# Patient Record
Sex: FEMALE | Race: WHITE | URBAN - NONMETROPOLITAN AREA
[De-identification: names, ages, dates, MRNs, and addresses within clinical notes are randomized per-mention and may not be internally consistent; named-entity substitution may affect disease eponyms.]

---

## 2021-01-14 ENCOUNTER — OFFICE VISIT (OUTPATIENT)
Dept: PRIMARY CARE CLINIC | Age: 36
End: 2021-01-14

## 2021-01-14 VITALS
TEMPERATURE: 97.5 F | RESPIRATION RATE: 18 BRPM | WEIGHT: 195 LBS | OXYGEN SATURATION: 96 % | BODY MASS INDEX: 31.34 KG/M2 | DIASTOLIC BLOOD PRESSURE: 82 MMHG | HEIGHT: 66 IN | HEART RATE: 104 BPM | SYSTOLIC BLOOD PRESSURE: 118 MMHG

## 2021-01-14 DIAGNOSIS — F41.9 ANXIETY: ICD-10-CM

## 2021-01-14 DIAGNOSIS — R63.5 WEIGHT GAIN: ICD-10-CM

## 2021-01-14 DIAGNOSIS — R63.5 WEIGHT GAIN: Primary | ICD-10-CM

## 2021-01-14 DIAGNOSIS — G43.809 OTHER MIGRAINE WITHOUT STATUS MIGRAINOSUS, NOT INTRACTABLE: ICD-10-CM

## 2021-01-14 LAB
ALBUMIN SERPL-MCNC: 4.3 G/DL (ref 3.5–5.2)
ALP BLD-CCNC: 79 U/L (ref 35–104)
ALT SERPL-CCNC: 31 U/L (ref 0–32)
ANION GAP SERPL CALCULATED.3IONS-SCNC: 13 MMOL/L (ref 7–16)
AST SERPL-CCNC: 21 U/L (ref 0–31)
BASOPHILS ABSOLUTE: 0.03 E9/L (ref 0–0.2)
BASOPHILS RELATIVE PERCENT: 0.6 % (ref 0–2)
BILIRUB SERPL-MCNC: <0.2 MG/DL (ref 0–1.2)
BUN BLDV-MCNC: 15 MG/DL (ref 6–20)
CALCIUM SERPL-MCNC: 9.1 MG/DL (ref 8.6–10.2)
CHLORIDE BLD-SCNC: 104 MMOL/L (ref 98–107)
CO2: 25 MMOL/L (ref 22–29)
CREAT SERPL-MCNC: 0.9 MG/DL (ref 0.5–1)
EOSINOPHILS ABSOLUTE: 0.02 E9/L (ref 0.05–0.5)
EOSINOPHILS RELATIVE PERCENT: 0.4 % (ref 0–6)
GFR AFRICAN AMERICAN: >60
GFR NON-AFRICAN AMERICAN: >60 ML/MIN/1.73
GLUCOSE BLD-MCNC: 99 MG/DL (ref 74–99)
HCT VFR BLD CALC: 44.8 % (ref 34–48)
HEMOGLOBIN: 13.8 G/DL (ref 11.5–15.5)
IMMATURE GRANULOCYTES #: 0.02 E9/L
IMMATURE GRANULOCYTES %: 0.4 % (ref 0–5)
LYMPHOCYTES ABSOLUTE: 1.79 E9/L (ref 1.5–4)
LYMPHOCYTES RELATIVE PERCENT: 36.5 % (ref 20–42)
MCH RBC QN AUTO: 29.2 PG (ref 26–35)
MCHC RBC AUTO-ENTMCNC: 30.8 % (ref 32–34.5)
MCV RBC AUTO: 94.7 FL (ref 80–99.9)
MONOCYTES ABSOLUTE: 0.34 E9/L (ref 0.1–0.95)
MONOCYTES RELATIVE PERCENT: 6.9 % (ref 2–12)
NEUTROPHILS ABSOLUTE: 2.71 E9/L (ref 1.8–7.3)
NEUTROPHILS RELATIVE PERCENT: 55.2 % (ref 43–80)
PDW BLD-RTO: 13 FL (ref 11.5–15)
PLATELET # BLD: 303 E9/L (ref 130–450)
PMV BLD AUTO: 10.4 FL (ref 7–12)
POTASSIUM SERPL-SCNC: 4.6 MMOL/L (ref 3.5–5)
RBC # BLD: 4.73 E12/L (ref 3.5–5.5)
SODIUM BLD-SCNC: 142 MMOL/L (ref 132–146)
TOTAL PROTEIN: 7.6 G/DL (ref 6.4–8.3)
TSH SERPL DL<=0.05 MIU/L-ACNC: 3.39 UIU/ML (ref 0.27–4.2)
WBC # BLD: 4.9 E9/L (ref 4.5–11.5)

## 2021-01-14 PROCEDURE — 99203 OFFICE O/P NEW LOW 30 MIN: CPT | Performed by: NURSE PRACTITIONER

## 2021-01-14 RX ORDER — PHENTERMINE HYDROCHLORIDE 37.5 MG/1
CAPSULE ORAL
COMMUNITY
End: 2021-01-14

## 2021-01-14 RX ORDER — BUTALBITAL, ACETAMINOPHEN AND CAFFEINE 300; 40; 50 MG/1; MG/1; MG/1
CAPSULE ORAL
COMMUNITY
End: 2021-09-20

## 2021-01-14 RX ORDER — PROMETHAZINE HYDROCHLORIDE 25 MG/1
25 TABLET ORAL EVERY 6 HOURS PRN
Qty: 30 TABLET | Refills: 2 | Status: SHIPPED | OUTPATIENT
Start: 2021-01-14

## 2021-01-14 RX ORDER — NAPROXEN 500 MG/1
500 TABLET ORAL 2 TIMES DAILY
COMMUNITY
Start: 2020-01-23 | End: 2021-01-14 | Stop reason: SDUPTHER

## 2021-01-14 RX ORDER — CYCLOBENZAPRINE HCL 10 MG
10 TABLET ORAL 3 TIMES DAILY PRN
COMMUNITY

## 2021-01-14 RX ORDER — ALPRAZOLAM 0.5 MG/1
TABLET ORAL
COMMUNITY
End: 2021-01-14 | Stop reason: SDUPTHER

## 2021-01-14 RX ORDER — ALPRAZOLAM 0.5 MG/1
TABLET ORAL
Qty: 60 TABLET | Refills: 0 | Status: SHIPPED | OUTPATIENT
Start: 2021-01-14 | End: 2021-02-14

## 2021-01-14 RX ORDER — ESTRADIOL 0.05 MG/D
1 FILM, EXTENDED RELEASE TRANSDERMAL
COMMUNITY

## 2021-01-14 RX ORDER — NAPROXEN 500 MG/1
500 TABLET ORAL 2 TIMES DAILY
Qty: 60 TABLET | Refills: 3 | Status: SHIPPED | OUTPATIENT
Start: 2021-01-14

## 2021-01-14 RX ORDER — PROMETHAZINE HYDROCHLORIDE 25 MG/1
25 TABLET ORAL EVERY 6 HOURS PRN
COMMUNITY
End: 2021-01-14 | Stop reason: SDUPTHER

## 2021-01-14 ASSESSMENT — ENCOUNTER SYMPTOMS
RESPIRATORY NEGATIVE: 1
EYES NEGATIVE: 1
GASTROINTESTINAL NEGATIVE: 1
ALLERGIC/IMMUNOLOGIC NEGATIVE: 1

## 2021-01-14 ASSESSMENT — PATIENT HEALTH QUESTIONNAIRE - PHQ9
SUM OF ALL RESPONSES TO PHQ QUESTIONS 1-9: 0

## 2021-01-14 NOTE — PROGRESS NOTES
Subjective  CC: Patient presents to establish. Was previously seeing a PA in [place]. Last seen about 12 months ago. HPI:   Will be seeing Dr. Shikha Marin in [place] for GYN. Patient is here to establish. She reports a history of migraines, states she has previously been on Topamax, amitriptyline, Imitrex, currently takes Fioricet and Maxalt to manage this. She does not take any SSRIs or SNRIs. She also reports a history of anxiety, previously has been on BuSpar which caused more anxiety. She states she takes Xanax about 3 times per week. 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 Her mother, who has early onset dementia, lives with them and cannot be alone. The patient also has 2 children, and she is going to school for nursing through [employer]. The patient states that she has gained about 45 pounds in the past year. She has been on phentermine but states this was only helpful while she was taking it and she gained the weight back. She states she is due for lab work including thyroid which will be checked today. She is up-to-date on cervical cancer screenings. She has had the influenza vaccine. ROS:  Review of Systems   Constitutional: Positive for unexpected weight change. Eyes: Negative. Respiratory: Negative. Cardiovascular: Negative. Gastrointestinal: Negative. Endocrine:        Weight gain   Genitourinary:        Status post hysterectomy   Musculoskeletal: Negative. Allergic/Immunologic: Negative. Neurological:        Migraines   Hematological: Negative.     Psychiatric/Behavioral:        Anxiety          Current Outpatient Medications:     naproxen (NAPROSYN) 500 MG tablet, Take 1 tablet by mouth 2 times daily, Disp: 60 tablet, Rfl: 3    ALPRAZolam (XANAX) 0.5 MG tablet, 1 tablet twice a day as needed, Disp: 60 tablet, Rfl: 0   promethazine (PHENERGAN) 25 MG tablet, Take 1 tablet by mouth every 6 hours as needed for Nausea, Disp: 30 tablet, Rfl: 2    estradiol (VIVELLE) 0.05 MG/24HR, Place 1 patch onto the skin, Disp: , Rfl:     cyclobenzaprine (FLEXERIL) 10 MG tablet, Take 10 mg by mouth daily, Disp: , Rfl:     butalbital-APAP-caffeine -40 MG CAPS per capsule, , Disp: , Rfl:    Allergies   Allergen Reactions    Amoxicillin Hives and Rash    Aspirin Hives    Cefaclor Hives    Sulfamethoxazole-Trimethoprim Hives and Rash    Sulfa Antibiotics Hives        PMH:  Past Medical History:   Diagnosis Date    Anxiety     Migraine without aura and without status migrainosus, not intractable         Objective  Vitals:    01/14/21 0942   BP: 118/82   Site: Left Upper Arm   Position: Sitting   Cuff Size: Medium Adult   Pulse: 104   Resp: 18   Temp: 97.5 °F (36.4 °C)   TempSrc: Temporal   SpO2: 96%   Weight: 195 lb (88.5 kg)   Height: 5' 6\" (1.676 m)      Exam:  Const: Appears healthy, well developed and well nourished. Appears overweight. Eyes: EOMI in both eyes. PERRL. ENMT: External ears WNL. Tympanic membranes are intact. Septum is in the midline. Posterior  pharynx shows no exudate, irritation or redness. Neck: Supple and symmetric. Palpation reveals no adenopathy. No masses appreciated. Thyroid  exhibits no nodule or thyromegaly. No JVD. Resp: Respirations are unlabored. Respiration rate is normal. Auscultate good airflow. No rales,  rhonchi or wheezes appreciated over the lungs bilaterally. Abdomen: BS x 4 quadrants. Abdomen soft, round, nontender. No organomegaly noted. CV: Rhythm is regular. S1 is normal. S2 is normal.  Extremities: No clubbing or cyanosis. Musculo: Walks with a normal gait. Upper Extremities: Full ROM bilaterally. Lower Extremities: Full  ROM bilaterally. Skin: Dry and warm with no rash. Neuro: Alert and oriented x3. Mood is normal. Affect is normal. Speech is articulate and fluent. Leandra Love was seen today for establish care. Diagnoses and all orders for this visit:    Weight gain  -     CBC Auto Differential; Future  -     Comprehensive Metabolic Panel; Future  -     TSH without Reflex; Future    Anxiety  -     ALPRAZolam (XANAX) 0.5 MG tablet; 1 tablet twice a day as needed  -     CBC Auto Differential; Future  -     Comprehensive Metabolic Panel; Future  -     TSH without Reflex; Future    Other migraine without status migrainosus, not intractable    Other orders  -     naproxen (NAPROSYN) 500 MG tablet; Take 1 tablet by mouth 2 times daily  -     promethazine (PHENERGAN) 25 MG tablet; Take 1 tablet by mouth every 6 hours as needed for Nausea       Care Plan:  Discussed with the patient that I think an SNRI may be helpful for her in regards to the anxiety as well as the migraines, and preferable compared to the Xanax and Fioricet. The patient is willing to consider, at this time we will continue the medications she is on, check lab work, and reevaluate in a few months. Notify me of any problems in the meantime. I will try to obtain her old records. OARRS report is reviewed and appropriate.

## 2021-03-18 ENCOUNTER — HOSPITAL ENCOUNTER (EMERGENCY)
Dept: HOSPITAL 83 - ED | Age: 36
Discharge: HOME | End: 2021-03-18
Payer: COMMERCIAL

## 2021-03-18 VITALS — HEIGHT: 65.98 IN | BODY MASS INDEX: 31.34 KG/M2 | WEIGHT: 195 LBS
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
## 2021-03-18 DIAGNOSIS — M62.830: ICD-10-CM

## 2021-03-18 DIAGNOSIS — Z88.5: ICD-10-CM
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
## 2021-03-18 DIAGNOSIS — Z88.1: ICD-10-CM

## 2021-03-18 DIAGNOSIS — Z88.2: ICD-10-CM
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
## 2021-03-18 DIAGNOSIS — Z88.8: ICD-10-CM
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
## 2021-03-18 DIAGNOSIS — S30.0XXA: Primary | ICD-10-CM

## 2021-09-20 ENCOUNTER — OFFICE VISIT (OUTPATIENT)
Dept: FAMILY MEDICINE CLINIC | Age: 36
End: 2021-09-20
Payer: COMMERCIAL

## 2021-09-20 VITALS
BODY MASS INDEX: 33.25 KG/M2 | DIASTOLIC BLOOD PRESSURE: 66 MMHG | TEMPERATURE: 97.4 F | SYSTOLIC BLOOD PRESSURE: 110 MMHG | OXYGEN SATURATION: 98 % | WEIGHT: 206 LBS | HEART RATE: 92 BPM

## 2021-09-20 DIAGNOSIS — U07.1 COVID-19: Primary | ICD-10-CM

## 2021-09-20 DIAGNOSIS — B96.89 ACUTE BACTERIAL SINUSITIS: ICD-10-CM

## 2021-09-20 DIAGNOSIS — J01.90 ACUTE BACTERIAL SINUSITIS: ICD-10-CM

## 2021-09-20 LAB
Lab: NORMAL
PERFORMING INSTRUMENT: NORMAL
QC PASS/FAIL: NORMAL
SARS-COV-2, POC: NORMAL

## 2021-09-20 PROCEDURE — 87426 SARSCOV CORONAVIRUS AG IA: CPT | Performed by: STUDENT IN AN ORGANIZED HEALTH CARE EDUCATION/TRAINING PROGRAM

## 2021-09-20 PROCEDURE — G8417 CALC BMI ABV UP PARAM F/U: HCPCS | Performed by: STUDENT IN AN ORGANIZED HEALTH CARE EDUCATION/TRAINING PROGRAM

## 2021-09-20 PROCEDURE — 99213 OFFICE O/P EST LOW 20 MIN: CPT | Performed by: STUDENT IN AN ORGANIZED HEALTH CARE EDUCATION/TRAINING PROGRAM

## 2021-09-20 PROCEDURE — 1036F TOBACCO NON-USER: CPT | Performed by: STUDENT IN AN ORGANIZED HEALTH CARE EDUCATION/TRAINING PROGRAM

## 2021-09-20 PROCEDURE — G8427 DOCREV CUR MEDS BY ELIG CLIN: HCPCS | Performed by: STUDENT IN AN ORGANIZED HEALTH CARE EDUCATION/TRAINING PROGRAM

## 2021-09-20 RX ORDER — DOXYCYCLINE HYCLATE 100 MG
100 TABLET ORAL 2 TIMES DAILY
Qty: 20 TABLET | Refills: 0 | Status: SHIPPED | OUTPATIENT
Start: 2021-09-20 | End: 2021-09-30

## 2021-09-20 ASSESSMENT — ENCOUNTER SYMPTOMS
SINUS PAIN: 1
SHORTNESS OF BREATH: 0
COUGH: 1
ABDOMINAL PAIN: 0
SINUS PRESSURE: 1
RHINORRHEA: 1
WHEEZING: 0
VOMITING: 0
NAUSEA: 0

## 2021-09-20 NOTE — PROGRESS NOTES
Sorin Villegas (:  1985) is a 31 y.o. female,, here for evaluation of the following chief complaint(s):  Cough, Generalized Body Aches (works at [employer]), and Headache         ASSESSMENT/PLAN:  1. COVID-19  -     POCT COVID-19, Antigen  -     COVID-19 Ambulatory; Future  2. Acute bacterial sinusitis  -     doxycycline hyclate (VIBRA-TABS) 100 MG tablet; Take 1 tablet by mouth 2 times daily for 10 days, Disp-20 tablet, R-0Normal    Rapid COVID negative, will send out. Fever, persistent infection, green sputum. Will treat for bacterial sinusitis. Discussed return precautions. Patient in agreement. Return if symptoms worsen or fail to improve. Subjective   SUBJECTIVE/OBJECTIVE:  8 or 9 days ago started with congestion  -congestion/phlegm causing cough  -facial and teeth pain  -bright green snot  -taking sudafed which is not helping much anymore. Has taken ibuprofen as well  -no SOB unless exerting herself  -does not take any other daily medicines  -prn migrain and anxiety meds  -hx of exercise induced asthma  -rapid covid negative      Review of Systems   Constitutional: Positive for fever. Negative for chills. HENT: Positive for congestion, ear pain, rhinorrhea, sinus pressure and sinus pain. Respiratory: Positive for cough. Negative for shortness of breath and wheezing. Cardiovascular: Negative for chest pain and leg swelling. Gastrointestinal: Negative for abdominal pain, nausea and vomiting. Genitourinary: Negative for dysuria and hematuria. Musculoskeletal: Negative for arthralgias and myalgias. Skin: Negative for rash and wound. Neurological: Negative for dizziness and light-headedness. Objective   Physical Exam  Vitals reviewed. Constitutional:       General: She is not in acute distress. HENT:      Head: Normocephalic and atraumatic.       Right Ear: Tympanic membrane normal.      Left Ear: Tympanic membrane normal.      Nose: Mucosal edema and congestion present. Right Turbinates: Swollen. Left Turbinates: Swollen. Right Sinus: Maxillary sinus tenderness and frontal sinus tenderness present. Left Sinus: Maxillary sinus tenderness and frontal sinus tenderness present. Eyes:      Extraocular Movements: Extraocular movements intact. Conjunctiva/sclera: Conjunctivae normal.   Cardiovascular:      Rate and Rhythm: Normal rate and regular rhythm. Pulmonary:      Effort: Pulmonary effort is normal.      Breath sounds: Normal breath sounds. No wheezing. Abdominal:      General: Abdomen is flat. There is no distension. Musculoskeletal:         General: No tenderness or deformity. Neurological:      General: No focal deficit present. Mental Status: She is alert and oriented to person, place, and time. An electronic signature was used to authenticate this note.     --Binta Hogue MD

## 2021-09-22 LAB
SARS-COV-2: NOT DETECTED
SOURCE: NORMAL

## 2023-12-12 ENCOUNTER — OFFICE VISIT (OUTPATIENT)
Dept: ORTHOPEDIC SURGERY | Age: 38
End: 2023-12-12
Payer: COMMERCIAL

## 2023-12-12 VITALS — HEIGHT: 66 IN | WEIGHT: 195 LBS | BODY MASS INDEX: 31.34 KG/M2

## 2023-12-12 DIAGNOSIS — M65.4 DE QUERVAIN'S TENOSYNOVITIS, LEFT: Primary | ICD-10-CM

## 2023-12-12 DIAGNOSIS — M25.532 LEFT WRIST PAIN: ICD-10-CM

## 2023-12-12 PROCEDURE — 99203 OFFICE O/P NEW LOW 30 MIN: CPT | Performed by: PHYSICIAN ASSISTANT

## 2023-12-12 RX ORDER — ERENUMAB-AOOE 140 MG/ML
INJECTION, SOLUTION SUBCUTANEOUS
COMMUNITY

## 2023-12-12 RX ORDER — PREDNISONE 10 MG/1
TABLET ORAL
Qty: 24 TABLET | Refills: 0 | Status: SHIPPED | OUTPATIENT
Start: 2023-12-12

## 2023-12-12 RX ORDER — LEVOTHYROXINE SODIUM 0.12 MG/1
125 TABLET ORAL EVERY MORNING
COMMUNITY
Start: 2023-11-21

## 2023-12-12 NOTE — PROGRESS NOTES
[facility]  New Patient Note      CHIEF COMPLAINT:   Chief Complaint   Patient presents with    Wrist Pain     Pt presents this PM with c/o pain in her L wrist. States that pain is in radial side of wrist, and extends into her thumb. Hx injections. No known PELON. Started noticing pain a few weeks ago. Pt is L handed. Has been taking Motrin for pain. HISTORY OF PRESENT ILLNESS:                The patient is a 41 y.o. female who presents today with complaints of left wrist pain times a few weeks, no known mechanism of injury. She has had similar symptoms in the past in which she has had cortisone injections. .  Pt localizes the pain to radial aspect of the wrist with pain extending into the thumb. Pt denies any numbness, tingling, loss of sensation or radiation of symptoms into fingers. Pain is worse with range of motion, palpation. They have tried at home therapies of ibuprofen for symptomatic relief. Pt is left hand dominant. Past Medical History:        Diagnosis Date    Anxiety     Migraine without aura and without status migrainosus, not intractable      Past Surgical History:        Procedure Laterality Date     SECTION      x 2    HYSTERECTOMY, VAGINAL  2015    KNEE SURGERY  2017    Had injury in high school, has had 4 arthroscopic surgeries and 4 reconstructions - current ok    WISDOM TOOTH EXTRACTION       Current Medications:   No current facility-administered medications for this visit. Allergies:  Amoxicillin, Aspirin, Cefaclor, Sulfamethoxazole-trimethoprim, Excedrin extra strength [asa-apap-caff buffered], and Sulfa antibiotics    Social History:   TOBACCO:   reports that she has never smoked. She has never used smokeless tobacco.  ETOH:   has no history on file for alcohol use. DRUGS:   has no history on file for drug use. Review of Systems   Constitutional: Negative for fever, chills, diaphoresis, appetite change and fatigue.    HENT: Negative for

## 2023-12-12 NOTE — PATIENT INSTRUCTIONS
*At this time I have recommended an oral steroid, Prednisone 10mg 3 tablets once daily by mouth for 4 days, then 2 tablets once daily for 4 days, then 1 tablet once daily for 4 days then STOP. I did discuss potential side effect such as GI upset, mood changes, depression, anxiety, change in sleep habits. The patient develops any of these signs or symptoms they will call the office immediately and we will discontinue the medication in appropriate fashion. They are aware that he should not use any other oral anti-inflammatories while on the oral steroids. They can use Tylenol 500mg 2 tablets PO q8 hours PRN pain. *Patient can use VOLTAREN GEL 1% (DICLOFENAC SODIUM) Apply 2 grams twice daily to the affected wrist as needed, which can be obtained over the counter.

## 2024-03-04 ENCOUNTER — TELEPHONE (OUTPATIENT)
Dept: INTERNAL MEDICINE | Age: 39
End: 2024-03-04

## 2024-03-04 NOTE — TELEPHONE ENCOUNTER
Specialty Medication Service    Date: 3/4/2024  Patient's Name: Gabbi Burton YOB: 1985            _____________________________________________________________________________________________    Patient is enrolled in the [facility] pharmacy benefits. A prescription for Aimovig was sent to [facility] pharmacy, however per patient insurance it will need to go to [facility]. Please review, sign and fax to pharmacy if order is still appropriate.     Hudson Ennis, PharmD [facility]  Ambulatory Clinical Pharmacist  Specialty Medication Services  Phone: [phone]  Fax: [phone]

## 2024-03-07 ENCOUNTER — ENROLLMENT (OUTPATIENT)
Dept: INTERNAL MEDICINE | Age: 39
End: 2024-03-07

## 2024-03-07 NOTE — TELEPHONE ENCOUNTER
Specialty Medication Service    Date: 3/7/2024  Patient's Name: Gabbi Burton YOB: 1985            _____________________________________________________________________________________________    Patient's specialist office did not send a prescription to [facility], but [facility] has transferred the prescription in.    Hudson Ennis, PharmD [facility]  Ambulatory Clinical Pharmacist  Specialty Medication Services  Phone: [phone]  Fax: [phone]    For Pharmacy Admin Tracking Only    Program: [facility]  CPA in place:  No  Recommendation Provided To: Pharmacy: 1  Intervention Detail: New Rx: 1, reason: Cost/Formulary Change  Intervention Accepted By: Pharmacy: 1  Time Spent (min): 15

## 2024-03-21 ENCOUNTER — TELEPHONE (OUTPATIENT)
Dept: INTERNAL MEDICINE | Age: 39
End: 2024-03-21

## 2024-03-21 NOTE — TELEPHONE ENCOUNTER
Specialty Medication Service    Date: 3/21/2024  Patient's Name: Gabbi Burton YOB: 1985            _____________________________________________________________________________________________    Called patient's specialist office to discuss progress on PA for Aimovig. Left voicemail explaining they needed to submit additional information for it to be approved.    Hudson Ennis, PharmD [facility]  Ambulatory Clinical Pharmacist  Specialty Medication Services  Phone: [phone]  Fax: [phone]

## 2024-03-28 NOTE — TELEPHONE ENCOUNTER
Specialty Medication Service    Date: 3/28/2024  Patient's Name: Gabbi Burton YOB: 1985            _____________________________________________________________________________________________    Spoke to patient to discuss status of PA. Patient is aware of the PA hold up and has been trying to contact her provider to get this completed. Provider's office informed patient today that they submitted the additional information required for the PA to be approved.    Scheduled patient for initial [facility] visit on 4/4/2024.    Hudson Ennis PharmD [facility]  Ambulatory Clinical Pharmacist  Specialty Medication Services  Phone: [phone]  Fax: [phone]    For Pharmacy Admin Tracking Only    Program: [facility]  CPA in place:  No  Recommendation Provided To: Patient/Caregiver: 1 via Telephone  Intervention Detail: Scheduled Appointment  Intervention Accepted By: Patient/Caregiver: 1  Time Spent (min): 20

## 2024-04-02 PROBLEM — G43.009 MIGRAINE WITHOUT AURA AND WITHOUT STATUS MIGRAINOSUS, NOT INTRACTABLE: Status: ACTIVE | Noted: 2018-08-28

## 2024-04-02 PROBLEM — Z90.710 HISTORY OF HYSTERECTOMY: Chronic | Status: ACTIVE | Noted: 2023-08-10

## 2024-04-02 PROBLEM — G47.26 CIRCADIAN RHYTHM SLEEP DISORDER, SHIFT WORK TYPE: Status: ACTIVE | Noted: 2023-07-26

## 2024-04-02 PROBLEM — E55.9 VITAMIN D DEFICIENCY: Chronic | Status: ACTIVE | Noted: 2023-07-26

## 2024-04-02 PROBLEM — I26.99 PULMONARY EMBOLISM (HCC): Status: ACTIVE | Noted: 2018-01-25

## 2024-04-02 PROBLEM — M24.469: Chronic | Status: ACTIVE | Noted: 2017-06-27

## 2024-04-02 PROBLEM — F41.1 GENERALIZED ANXIETY DISORDER: Status: ACTIVE | Noted: 2023-07-06

## 2024-04-02 PROBLEM — E03.9 HYPOTHYROIDISM: Chronic | Status: ACTIVE | Noted: 2023-08-10

## 2024-04-02 PROBLEM — M62.830 MUSCLE SPASM OF BACK: Chronic | Status: ACTIVE | Noted: 2021-03-18

## 2024-04-02 PROBLEM — Q61.5 MEDULLARY SPONGE KIDNEY OF BOTH KIDNEYS: Chronic | Status: ACTIVE | Noted: 2017-11-07

## 2024-04-04 ENCOUNTER — TELEPHONE (OUTPATIENT)
Dept: INTERNAL MEDICINE | Age: 39
End: 2024-04-04

## 2024-04-04 NOTE — TELEPHONE ENCOUNTER
Specialty Medication Service    Date: 4/4/2024  Patient's Name: Gabbi Burton YOB: 1985            _____________________________________________________________________________________________    Called patient to inform her that her PA for Aimovig was denied, therefore she no longer needed her appt scheduled today (04/04/24) at 3 pm. Told patient to follow up with provider to have them resubmit the PA with all the proper information, or to find out what other treatment options may be. Told patient if the PA is approved in the future we would reach out for scheduling at that time.    Dayami Foy CPhT  Pharmacy   Specialty Medication Services   Phone: [phone] option 4    For Pharmacy Admin Tracking Only    Program: [facility]  CPA in place:  No  Recommendation Provided To:   Intervention Detail:   Intervention Accepted By:   Gap Closed?:    Time Spent (min): 15